# Patient Record
Sex: MALE | Race: WHITE | NOT HISPANIC OR LATINO | Employment: UNEMPLOYED | URBAN - METROPOLITAN AREA
[De-identification: names, ages, dates, MRNs, and addresses within clinical notes are randomized per-mention and may not be internally consistent; named-entity substitution may affect disease eponyms.]

---

## 2017-04-24 ENCOUNTER — HOSPITAL ENCOUNTER (OUTPATIENT)
Facility: HOSPITAL | Age: 72
Discharge: HOME | End: 2017-04-25
Attending: EMERGENCY MEDICINE | Admitting: HOSPITALIST
Payer: MEDICARE

## 2017-04-24 DIAGNOSIS — R07.9 CHEST PAIN, UNSPECIFIED TYPE: Primary | ICD-10-CM

## 2017-04-24 DIAGNOSIS — I10 ESSENTIAL HYPERTENSION: ICD-10-CM

## 2017-04-24 DIAGNOSIS — I10 HTN (HYPERTENSION): ICD-10-CM

## 2017-04-24 DIAGNOSIS — Z82.49 FAMILY HISTORY OF ISCHEMIC HEART DISEASE: ICD-10-CM

## 2017-04-24 DIAGNOSIS — R07.9 CHEST PAIN: ICD-10-CM

## 2017-04-24 DIAGNOSIS — K21.9 GERD (GASTROESOPHAGEAL REFLUX DISEASE): ICD-10-CM

## 2017-04-24 PROCEDURE — 93005 ELECTROCARDIOGRAM TRACING: CPT

## 2017-04-24 PROCEDURE — 93010 ELECTROCARDIOGRAM REPORT: CPT | Mod: ,,, | Performed by: INTERNAL MEDICINE

## 2017-04-24 PROCEDURE — 99285 EMERGENCY DEPT VISIT HI MDM: CPT | Mod: ,,, | Performed by: EMERGENCY MEDICINE

## 2017-04-24 PROCEDURE — 99285 EMERGENCY DEPT VISIT HI MDM: CPT | Mod: 25

## 2017-04-24 NOTE — IP AVS SNAPSHOT
[facility]  [address]  [facility] [address]  Phone: [phone]           Patient Discharge Instructions   Our goal is to set you up for success. This packet includes information on your condition, medications, and your home care.  It will help you care for yourself to prevent having to return to the hospital.     Please ask your nurse if you have any questions.      There are many details to remember when preparing to leave the hospital. Here is what you will need to do:    1. Take your medicine. If you are prescribed medications, review your Medication List on the following pages. You may have new medications to  at the pharmacy and others that you'll need to stop taking. Review the instructions for how and when to take your medications. Talk with your doctor or nurses if you are unsure of what to do.     2. Go to your follow-up appointments. Specific follow-up information is listed in the following pages. Your may be contacted by a nurse or clinical provider about future appointments. Be sure we have all of the phone numbers to reach you. Please contact your provider's office if you are unable to make an appointment.     3. Watch for warning signs. Your doctor or nurse will give you detailed warning signs to watch for and when to call for assistance. These instructions may also include educational information about your condition. If you experience any of warning signs to your health, call your doctor.           [facility]  Unless otherwise directed by your provider, please   contact [facility], our nurse care line   that is available for 24/7 assistance.     [phone] (toll-free)     Registered nurses in the [facility]   provide: appointment scheduling, clinical advisement, health education, and other advisory services.                  ** Verify the list of medication(s) below is accurate and up to date. Carry this with you in case of  emergency. If your medications have changed, please notify your healthcare provider.             Medication List      CHANGE how you take these medications        Additional Info                      atorvastatin 20 MG tablet   Commonly known as:  LIPITOR   Quantity:  30 tablet   Refills:  0   Dose:  20 mg   Indications:  need to clarify strength   What changed:    - medication strength  - how much to take    Instructions:  Take 1 tablet (20 mg total) by mouth once daily.     Begin Date    AM    Noon    PM    Bedtime         CONTINUE taking these medications        Additional Info                      aspirin 162 MG Tbec   Refills:  0   Dose:  81 mg    Instructions:  Take 81 mg by mouth 2 (two) times daily.     Begin Date    AM    Noon    PM    Bedtime       lisinopril 2.5 MG tablet   Commonly known as:  PRINIVIL,ZESTRIL   Refills:  0   Dose:  2.5 mg    Last time this was given:  2.5 mg on 4/25/2017  2:51 PM   Instructions:  Take 2.5 mg by mouth 2 (two) times daily.     Begin Date    AM    Noon    PM    Bedtime       omeprazole 40 MG capsule   Commonly known as:  PRILOSEC   Refills:  0   Dose:  40 mg    Instructions:  Take 40 mg by mouth once daily.     Begin Date    AM    Noon    PM    Bedtime       sildenafil 25 MG tablet   Commonly known as:  VIAGRA   Refills:  0   Dose:  25 mg    Instructions:  Take 25 mg by mouth daily as needed for Erectile Dysfunction.     Begin Date    AM    Noon    PM    Bedtime            Where to Get Your Medications      You can get these medications from any pharmacy     Bring a paper prescription for each of these medications     atorvastatin 20 MG tablet                  Please bring to all follow up appointments:    1. A copy of your discharge instructions.  2. All medicines you are currently taking in their original bottles.  3. Identification and insurance card.    Please arrive 15 minutes ahead of scheduled appointment time.    Please call 24 hours in advance if you must  reschedule your appointment and/or time.        Follow-up Information     Please follow up.    Why:  Follow up with PCP in 1 week         Discharge Instructions     Future Orders    Activity as tolerated     Call MD for:  difficulty breathing or increased cough     Call MD for:  increased confusion or weakness     Call MD for:  persistent dizziness, light-headedness, or visual disturbances     Call MD for:  persistent nausea and vomiting or diarrhea     Call MD for:  severe persistent headache     Call MD for:  severe uncontrolled pain     Call MD for:  temperature >100.4     Diet general     Questions:    Total calories:      Fat restriction, if any:      Protein restriction, if any:      Na restriction, if any:      Fluid restriction:      Additional restrictions:  Cardiac (Low Na/Chol)        Primary Diagnosis     Your primary diagnosis was:  Chest Pain      Admission Information     Date & Time Provider Department [facility]    4/24/2017 11:56 PM Miguel Mccoy MD [facility] [id number]      Care Providers     Provider Role Specialty Primary office phone    Miguel Mccoy MD Attending Provider Hospitalist [phone]    Anupam Lopez MD Team Attending  Hospitalist [phone]    Miguel Mccoy MD Team Attending  Hospitalist [phone]      Your Vitals Were     BP Pulse Temp Resp Height Weight    136/75 (BP Location: Right arm, Patient Position: Lying, BP Method: Automatic) 88 98.2 °F (36.8 °C) (Oral) 19 6' (1.829 m) 94.4 kg (208 lb 1.8 oz)    SpO2 BMI             93% 28.23 kg/m2         Recent Lab Values     No lab values to display.      Allergies as of 4/25/2017     No Known Allergies      Advance Directives     An advance directive is a document which, in the event you are no longer able to make decisions for yourself, tells your healthcare team what kind of treatment you do or do not want to receive, or who you would like to make those decisions for you.  If you do not currently have an  advance directive, [facility] encourages you to create one.  For more information call:  [phone]), [phone] ([phone]),  or log on to [email or web address].        Smoking Cessation     If you would like to quit smoking:   You may be eligible for free services if you are a [place] resident and started smoking cigarettes before September 1, 1988.  Call the Smoking Cessation Trust ([facility]) toll free at [phone] or [phone].   Call 3-949-QUIT-NOW if you do not meet the above criteria.   Contact us via email: [email or web address]   View our website for more information: [email or web address]        Language Assistance Services     ATTENTION: Language assistance services are available, free of charge. Please call [phone].      ATENCIÓN: Si habla español, tiene a haney disposición servicios gratuitos de asistencia lingüística. Llame al [phone].     CHÚ Ý: N?u b?n nói Ti?ng Vi?t, có các d?ch v? h? tr? ngôn ng? mi?n phí dành cho b?n. G?i s? [phone].         [facility] complies with applicable Federal civil rights laws and does not discriminate on the basis of race, color, national origin, age, disability, or sex.

## 2017-04-25 VITALS
RESPIRATION RATE: 19 BRPM | SYSTOLIC BLOOD PRESSURE: 136 MMHG | BODY MASS INDEX: 28.19 KG/M2 | HEART RATE: 88 BPM | HEIGHT: 72 IN | DIASTOLIC BLOOD PRESSURE: 75 MMHG | OXYGEN SATURATION: 93 % | TEMPERATURE: 98 F | WEIGHT: 208.13 LBS

## 2017-04-25 PROBLEM — K21.9 GERD (GASTROESOPHAGEAL REFLUX DISEASE): Status: ACTIVE | Noted: 2017-04-25

## 2017-04-25 PROBLEM — I10 HTN (HYPERTENSION): Status: ACTIVE | Noted: 2017-04-25

## 2017-04-25 PROBLEM — R07.9 CHEST PAIN: Status: ACTIVE | Noted: 2017-04-25

## 2017-04-25 LAB
ALBUMIN SERPL BCP-MCNC: 3.7 G/DL
ALP SERPL-CCNC: 76 U/L
ALT SERPL W/O P-5'-P-CCNC: 37 U/L
ANION GAP SERPL CALC-SCNC: 12 MMOL/L
ANION GAP SERPL CALC-SCNC: 13 MMOL/L
AORTIC VALVE REGURGITATION: NORMAL
AST SERPL-CCNC: 33 U/L
BASOPHILS # BLD AUTO: 0 K/UL
BASOPHILS # BLD AUTO: 0.01 K/UL
BASOPHILS NFR BLD: 0 %
BASOPHILS NFR BLD: 0.1 %
BILIRUB SERPL-MCNC: 0.5 MG/DL
BUN SERPL-MCNC: 14 MG/DL
BUN SERPL-MCNC: 15 MG/DL
CALCIUM SERPL-MCNC: 9 MG/DL
CALCIUM SERPL-MCNC: 9.6 MG/DL
CHLORIDE SERPL-SCNC: 101 MMOL/L
CHLORIDE SERPL-SCNC: 103 MMOL/L
CHOLEST/HDLC SERPL: 3.1 {RATIO}
CO2 SERPL-SCNC: 23 MMOL/L
CO2 SERPL-SCNC: 25 MMOL/L
CREAT SERPL-MCNC: 0.9 MG/DL
CREAT SERPL-MCNC: 0.9 MG/DL
DIASTOLIC DYSFUNCTION: NO
DIFFERENTIAL METHOD: ABNORMAL
DIFFERENTIAL METHOD: ABNORMAL
EOSINOPHIL # BLD AUTO: 0 K/UL
EOSINOPHIL # BLD AUTO: 0.1 K/UL
EOSINOPHIL NFR BLD: 0.3 %
EOSINOPHIL NFR BLD: 1 %
ERYTHROCYTE [DISTWIDTH] IN BLOOD BY AUTOMATED COUNT: 12.1 %
ERYTHROCYTE [DISTWIDTH] IN BLOOD BY AUTOMATED COUNT: 12.1 %
EST. GFR  (AFRICAN AMERICAN): >60 ML/MIN/1.73 M^2
EST. GFR  (AFRICAN AMERICAN): >60 ML/MIN/1.73 M^2
EST. GFR  (NON AFRICAN AMERICAN): >60 ML/MIN/1.73 M^2
EST. GFR  (NON AFRICAN AMERICAN): >60 ML/MIN/1.73 M^2
ESTIMATED PA SYSTOLIC PRESSURE: 33.47
GLUCOSE SERPL-MCNC: 125 MG/DL
GLUCOSE SERPL-MCNC: 140 MG/DL
HCT VFR BLD AUTO: 38.9 %
HCT VFR BLD AUTO: 40.3 %
HDL/CHOLESTEROL RATIO: 32.7 %
HDLC SERPL-MCNC: 153 MG/DL
HDLC SERPL-MCNC: 50 MG/DL
HGB BLD-MCNC: 13.9 G/DL
HGB BLD-MCNC: 14.2 G/DL
LDLC SERPL CALC-MCNC: 80.6 MG/DL
LYMPHOCYTES # BLD AUTO: 0.9 K/UL
LYMPHOCYTES # BLD AUTO: 1 K/UL
LYMPHOCYTES NFR BLD: 12.7 %
LYMPHOCYTES NFR BLD: 14 %
MAGNESIUM SERPL-MCNC: 2 MG/DL
MCH RBC QN AUTO: 37.9 PG
MCH RBC QN AUTO: 38.1 PG
MCHC RBC AUTO-ENTMCNC: 35.2 %
MCHC RBC AUTO-ENTMCNC: 35.7 %
MCV RBC AUTO: 107 FL
MCV RBC AUTO: 108 FL
MITRAL VALVE REGURGITATION: NORMAL
MONOCYTES # BLD AUTO: 0.6 K/UL
MONOCYTES # BLD AUTO: 0.7 K/UL
MONOCYTES NFR BLD: 7.9 %
MONOCYTES NFR BLD: 9.3 %
NEUTROPHILS # BLD AUTO: 5.4 K/UL
NEUTROPHILS # BLD AUTO: 5.4 K/UL
NEUTROPHILS NFR BLD: 76.4 %
NEUTROPHILS NFR BLD: 77.4 %
NONHDLC SERPL-MCNC: 103 MG/DL
PHOSPHATE SERPL-MCNC: 2.5 MG/DL
PLATELET # BLD AUTO: 152 K/UL
PLATELET # BLD AUTO: 166 K/UL
PMV BLD AUTO: 10.4 FL
PMV BLD AUTO: 10.6 FL
POTASSIUM SERPL-SCNC: 3.9 MMOL/L
POTASSIUM SERPL-SCNC: 4.2 MMOL/L
PROT SERPL-MCNC: 7.1 G/DL
RBC # BLD AUTO: 3.65 M/UL
RBC # BLD AUTO: 3.75 M/UL
RETIRED EF AND QEF - SEE NOTES: 60 (ref 55–65)
SODIUM SERPL-SCNC: 138 MMOL/L
SODIUM SERPL-SCNC: 139 MMOL/L
TRICUSPID VALVE REGURGITATION: NORMAL
TRIGL SERPL-MCNC: 112 MG/DL
TROPONIN I SERPL DL<=0.01 NG/ML-MCNC: 0.01 NG/ML
TROPONIN I SERPL DL<=0.01 NG/ML-MCNC: 0.01 NG/ML
TROPONIN I SERPL DL<=0.01 NG/ML-MCNC: 0.02 NG/ML
WBC # BLD AUTO: 7.02 K/UL
WBC # BLD AUTO: 7.07 K/UL

## 2017-04-25 PROCEDURE — 80053 COMPREHEN METABOLIC PANEL: CPT

## 2017-04-25 PROCEDURE — G0378 HOSPITAL OBSERVATION PER HR: HCPCS

## 2017-04-25 PROCEDURE — 93320 DOPPLER ECHO COMPLETE: CPT | Mod: 26,,, | Performed by: INTERNAL MEDICINE

## 2017-04-25 PROCEDURE — 25000003 PHARM REV CODE 250: Performed by: NURSE PRACTITIONER

## 2017-04-25 PROCEDURE — 93325 DOPPLER ECHO COLOR FLOW MAPG: CPT | Mod: 26,,, | Performed by: INTERNAL MEDICINE

## 2017-04-25 PROCEDURE — 80061 LIPID PANEL: CPT

## 2017-04-25 PROCEDURE — 84100 ASSAY OF PHOSPHORUS: CPT

## 2017-04-25 PROCEDURE — 25000003 PHARM REV CODE 250: Performed by: EMERGENCY MEDICINE

## 2017-04-25 PROCEDURE — 93351 STRESS TTE COMPLETE: CPT | Mod: 26,,, | Performed by: INTERNAL MEDICINE

## 2017-04-25 PROCEDURE — 84484 ASSAY OF TROPONIN QUANT: CPT

## 2017-04-25 PROCEDURE — 85025 COMPLETE CBC W/AUTO DIFF WBC: CPT

## 2017-04-25 PROCEDURE — 80048 BASIC METABOLIC PNL TOTAL CA: CPT

## 2017-04-25 PROCEDURE — 99204 OFFICE O/P NEW MOD 45 MIN: CPT | Mod: 25,GC,, | Performed by: INTERNAL MEDICINE

## 2017-04-25 PROCEDURE — 93325 DOPPLER ECHO COLOR FLOW MAPG: CPT

## 2017-04-25 PROCEDURE — 99220 PR INITIAL OBSERVATION CARE,LEVL III: CPT | Mod: ,,, | Performed by: NURSE PRACTITIONER

## 2017-04-25 PROCEDURE — 84484 ASSAY OF TROPONIN QUANT: CPT | Mod: 91

## 2017-04-25 PROCEDURE — 36415 COLL VENOUS BLD VENIPUNCTURE: CPT

## 2017-04-25 PROCEDURE — 83735 ASSAY OF MAGNESIUM: CPT

## 2017-04-25 RX ORDER — ATORVASTATIN CALCIUM 10 MG/1
10 TABLET, FILM COATED ORAL DAILY
Status: ON HOLD | COMMUNITY
End: 2017-04-25

## 2017-04-25 RX ORDER — ACETAMINOPHEN 325 MG/1
650 TABLET ORAL EVERY 4 HOURS PRN
Status: DISCONTINUED | OUTPATIENT
Start: 2017-04-25 | End: 2017-04-25 | Stop reason: HOSPADM

## 2017-04-25 RX ORDER — CYCLOBENZAPRINE HCL 5 MG
5 TABLET ORAL 3 TIMES DAILY PRN
Status: DISCONTINUED | OUTPATIENT
Start: 2017-04-25 | End: 2017-04-25 | Stop reason: HOSPADM

## 2017-04-25 RX ORDER — NAPROXEN SODIUM 220 MG/1
81 TABLET, FILM COATED ORAL DAILY
Status: DISCONTINUED | OUTPATIENT
Start: 2017-04-25 | End: 2017-04-25

## 2017-04-25 RX ORDER — AMOXICILLIN 250 MG
1 CAPSULE ORAL 2 TIMES DAILY PRN
Status: DISCONTINUED | OUTPATIENT
Start: 2017-04-25 | End: 2017-04-25 | Stop reason: HOSPADM

## 2017-04-25 RX ORDER — LISINOPRIL 2.5 MG/1
2.5 TABLET ORAL DAILY
Status: DISCONTINUED | OUTPATIENT
Start: 2017-04-25 | End: 2017-04-25 | Stop reason: HOSPADM

## 2017-04-25 RX ORDER — HYDRALAZINE HYDROCHLORIDE 25 MG/1
25 TABLET, FILM COATED ORAL EVERY 6 HOURS PRN
Status: DISCONTINUED | OUTPATIENT
Start: 2017-04-25 | End: 2017-04-25 | Stop reason: HOSPADM

## 2017-04-25 RX ORDER — ATORVASTATIN CALCIUM 20 MG/1
20 TABLET, FILM COATED ORAL DAILY
Qty: 30 TABLET | Refills: 0 | Status: SHIPPED | OUTPATIENT
Start: 2017-04-25

## 2017-04-25 RX ORDER — IBUPROFEN 200 MG
16 TABLET ORAL
Status: DISCONTINUED | OUTPATIENT
Start: 2017-04-25 | End: 2017-04-25 | Stop reason: HOSPADM

## 2017-04-25 RX ORDER — IBUPROFEN 200 MG
24 TABLET ORAL
Status: DISCONTINUED | OUTPATIENT
Start: 2017-04-25 | End: 2017-04-25 | Stop reason: HOSPADM

## 2017-04-25 RX ORDER — PANTOPRAZOLE SODIUM 40 MG/1
40 TABLET, DELAYED RELEASE ORAL DAILY
Status: DISCONTINUED | OUTPATIENT
Start: 2017-04-25 | End: 2017-04-25 | Stop reason: HOSPADM

## 2017-04-25 RX ORDER — RAMELTEON 8 MG/1
8 TABLET ORAL NIGHTLY PRN
Status: DISCONTINUED | OUTPATIENT
Start: 2017-04-25 | End: 2017-04-25 | Stop reason: HOSPADM

## 2017-04-25 RX ORDER — ENOXAPARIN SODIUM 100 MG/ML
40 INJECTION SUBCUTANEOUS EVERY 24 HOURS
Status: DISCONTINUED | OUTPATIENT
Start: 2017-04-25 | End: 2017-04-25 | Stop reason: HOSPADM

## 2017-04-25 RX ORDER — NAPROXEN SODIUM 220 MG/1
81 TABLET, FILM COATED ORAL DAILY
Status: DISCONTINUED | OUTPATIENT
Start: 2017-04-25 | End: 2017-04-25 | Stop reason: HOSPADM

## 2017-04-25 RX ORDER — ASPIRIN 325 MG
325 TABLET ORAL ONCE
Status: DISCONTINUED | OUTPATIENT
Start: 2017-04-25 | End: 2017-04-25

## 2017-04-25 RX ORDER — GLUCAGON 1 MG
1 KIT INJECTION
Status: DISCONTINUED | OUTPATIENT
Start: 2017-04-25 | End: 2017-04-25 | Stop reason: HOSPADM

## 2017-04-25 RX ORDER — ONDANSETRON 8 MG/1
8 TABLET, ORALLY DISINTEGRATING ORAL EVERY 8 HOURS PRN
Status: DISCONTINUED | OUTPATIENT
Start: 2017-04-25 | End: 2017-04-25 | Stop reason: HOSPADM

## 2017-04-25 RX ADMIN — ALUMINUM HYDROXIDE, MAGNESIUM HYDROXIDE, AND SIMETHICONE 50 ML: 200; 200; 20 SUSPENSION ORAL at 04:04

## 2017-04-25 RX ADMIN — ASPIRIN 81 MG CHEWABLE TABLET 81 MG: 81 TABLET CHEWABLE at 02:04

## 2017-04-25 RX ADMIN — LISINOPRIL 2.5 MG: 2.5 TABLET ORAL at 02:04

## 2017-04-25 RX ADMIN — CYCLOBENZAPRINE HYDROCHLORIDE 5 MG: 5 TABLET, FILM COATED ORAL at 04:04

## 2017-04-25 RX ADMIN — PANTOPRAZOLE SODIUM 40 MG: 40 TABLET, DELAYED RELEASE ORAL at 02:04

## 2017-04-25 NOTE — ED TRIAGE NOTES
Was watching tv tonight and started having ear pain, neck pain. Chest and back pain started while he was driving here. Reports he checked his bp at home 179/106 and checked it a second time and it was 160/90. Pt denies similar symptoms in the past. C/o bilateral chest wall pain, upper back pain, and jaw pain rates 6/10.

## 2017-04-25 NOTE — ED PROVIDER NOTES
Encounter Date: 4/24/2017       History     Chief Complaint   Patient presents with    Hypertension     Started with bilateral ear pain and neck pain. Took BP and it was elevated.     Review of patient's allergies indicates:  No Known Allergies  HPI Comments: 72 yo M to the ED with CC of acute onset of dull achy pain in the jaw, that traveled down into his chest within minutes and is described as someone punching him.  Onset of symptoms 2 hours PTA. He was resting watching television when the pain began. He has never had this pain before. He has HTN, manages with medication and Hx of prostate cancer, treated and followed. No recent trauma or injury. No nausea or emesis.     Past Medical History:   Diagnosis Date    Fatty liver     GERD (gastroesophageal reflux disease)     Hiatal hernia     Hypertension      Past Surgical History:   Procedure Laterality Date    APPENDECTOMY      CARDIAC CATHETERIZATION      ESOPHAGOGASTRODUODENOSCOPY      LIVER BIOPSY      PROSTATECTOMY      REMOVAL OF CYST FROM RIGHT KNEE       No family history on file.  Social History   Substance Use Topics    Smoking status: Former Smoker     Quit date: 10/12/1981    Smokeless tobacco: Never Used    Alcohol use Yes      Comment: DAILY      Review of Systems   Constitutional: Negative for fever.   HENT: Negative for sore throat.    Respiratory: Negative for shortness of breath.    Cardiovascular: Positive for chest pain. Negative for palpitations and leg swelling.   Gastrointestinal: Negative for nausea.   Genitourinary: Negative for dysuria.   Musculoskeletal: Negative for back pain.   Skin: Negative for rash.   Neurological: Negative for weakness.   Hematological: Does not bruise/bleed easily.       Physical Exam   Initial Vitals   BP Pulse Resp Temp SpO2   04/24/17 2324 04/24/17 2324 04/24/17 2324 04/24/17 2324 04/24/17 2324   194/93 84 16 98 °F (36.7 °C) 97 %     Physical Exam    Constitutional: Vital signs are normal. He  appears well-developed and well-nourished.   HENT:   Head: Normocephalic and atraumatic.   Normal exam   Eyes: Conjunctivae are normal.   Cardiovascular: Normal rate and regular rhythm. Exam reveals no gallop and no friction rub.    No murmur heard.  Pulmonary/Chest: No respiratory distress. He has no wheezes. He has no rhonchi. He has no rales. He exhibits no tenderness.   Abdominal: Soft. Normal appearance, normal aorta and bowel sounds are normal.   Musculoskeletal: Normal range of motion.   Neurological: He is alert and oriented to person, place, and time.   Skin: Skin is warm and intact.   Psychiatric: He has a normal mood and affect. His speech is normal and behavior is normal. Cognition and memory are normal.         ED Course   Procedures  Labs Reviewed   CBC W/ AUTO DIFFERENTIAL - Abnormal; Notable for the following:        Result Value    RBC 3.65 (*)     Hemoglobin 13.9 (*)     Hematocrit 38.9 (*)      (*)     MCH 38.1 (*)     Gran% 76.4 (*)     Lymph% 14.0 (*)     All other components within normal limits   COMPREHENSIVE METABOLIC PANEL - Abnormal; Notable for the following:     Glucose 140 (*)     All other components within normal limits   TROPONIN I             Medical Decision Making:   ED Management:  ACS evaluation in the ER has been negative to this point. Pt blood pressure has improved without treatment in the ED. He appears comfortable and is in NAD.   Plan to Obs.                    ED Course     Clinical Impression:   The primary encounter diagnosis was Chest pain, unspecified type. Diagnoses of Chest pain and Essential hypertension were also pertinent to this visit.    Disposition:   Disposition: Admitted  Condition: Stable       Joel Suarez PA-C  04/25/17 0357

## 2017-04-25 NOTE — ASSESSMENT & PLAN NOTE
- BP elevated on arrival 194/93 now 134/89  - continue home lisinopril dosing  - will add PRN hydralazine for SBP greater than 180

## 2017-04-25 NOTE — H&P
[facility] Hospital Medicine  History & Physical    Patient Name: Jb Dorsey  MRN: [id number]  Admission Date: 4/24/2017  Attending Physician: Hardik Aburto DO   Primary Care Provider: Primary Doctor [facility] Medicine Team: [facility] [place] [place] [facility]OSP MED E Kt Quinteros NP     Patient information was obtained from patient and ER records.     Subjective:     Principal Problem:Chest pain    Chief Complaint:   Chief Complaint   Patient presents with    Hypertension     Started with bilateral ear pain and neck pain. Took BP and it was elevated.        HPI:      Past Medical History:   Diagnosis Date    Fatty liver     GERD (gastroesophageal reflux disease)     Hiatal hernia     Hypertension        Past Surgical History:   Procedure Laterality Date    APPENDECTOMY      CARDIAC CATHETERIZATION      ESOPHAGOGASTRODUODENOSCOPY      LIVER BIOPSY      PROSTATECTOMY      REMOVAL OF CYST FROM RIGHT KNEE         Review of patient's allergies indicates:  No Known Allergies    No current facility-administered medications on file prior to encounter.      Current Outpatient Prescriptions on File Prior to Encounter   Medication Sig    aspirin 162 MG TbEC Take 162 mg by mouth every 6 (six) hours as needed.    lisinopril (PRINIVIL,ZESTRIL) 2.5 MG tablet Take 2.5 mg by mouth once daily.    omeprazole (PRILOSEC) 40 MG capsule Take 40 mg by mouth once daily.    sildenafil (VIAGRA) 25 MG tablet Take 25 mg by mouth daily as needed for Erectile Dysfunction.     Family History     None        Social History Main Topics    Smoking status: Former Smoker     Quit date: 10/12/1981    Smokeless tobacco: Never Used    Alcohol use Yes      Comment: DAILY     Drug use: No    Sexual activity: Yes     Partners: Female     Review of Systems   Constitutional: Negative for chills and fever.   HENT: Positive for ear pain. Negative for congestion.    Eyes: Negative for visual disturbance.   Respiratory: Negative for cough, chest  tightness and shortness of breath.    Cardiovascular: Positive for chest pain. Negative for palpitations and leg swelling.   Gastrointestinal: Negative for abdominal distention, abdominal pain, nausea and vomiting.   Endocrine: Negative for cold intolerance and heat intolerance.   Genitourinary: Negative for dysuria and hematuria.   Musculoskeletal: Positive for back pain and neck pain. Negative for joint swelling.   Skin: Negative for rash.   Neurological: Negative for dizziness, seizures, syncope and light-headedness.   Hematological: Does not bruise/bleed easily.     Objective:     Vital Signs (Most Recent):  Temp: 98 °F (36.7 °C) (04/24/17 2324)  Pulse: 84 (04/25/17 0244)  Resp: 16 (04/25/17 0244)  BP: (!) 163/83 (04/25/17 0244)  SpO2: 97 % (04/25/17 0251) Vital Signs (24h Range):  Temp:  [98 °F (36.7 °C)] 98 °F (36.7 °C)  Pulse:  [82-84] 84  Resp:  [16-18] 16  SpO2:  [95 %-97 %] 97 %  BP: (153-194)/(74-93) 163/83     Weight: 97.1 kg (214 lb)  Body mass index is 29.02 kg/(m^2).    Physical Exam   Constitutional: He is oriented to person, place, and time. He appears well-developed and well-nourished. No distress.   HENT:   Head: Normocephalic and atraumatic.   Eyes: EOM are normal. Pupils are equal, round, and reactive to light.   Neck: Normal range of motion. Neck supple.   Cardiovascular: Normal rate, regular rhythm, normal heart sounds and intact distal pulses.  Exam reveals no gallop and no friction rub.    No murmur heard.  Pulmonary/Chest: Effort normal and breath sounds normal. No respiratory distress. He has no wheezes. He has no rales.   Abdominal: Soft. Bowel sounds are normal. He exhibits no distension. There is no tenderness.   Musculoskeletal: Normal range of motion. He exhibits no edema.   Neurological: He is alert and oriented to person, place, and time.   Skin: Skin is warm and dry. He is not diaphoretic.   Psychiatric: He has a normal mood and affect. His behavior is normal.   Nursing note and  vitals reviewed.     Significant Labs:   BMP:   Recent Labs  Lab 04/25/17 0054   *      K 3.9      CO2 23   BUN 15   CREATININE 0.9   CALCIUM 9.0     CBC:   Recent Labs  Lab 04/25/17 0054   WBC 7.07   HGB 13.9*   HCT 38.9*        Troponin:   Recent Labs  Lab 04/25/17 0054   TROPONINI 0.015     All pertinent labs within the past 24 hours have been reviewed.    Significant Imaging: I have reviewed all pertinent imaging results/findings within the past 24 hours.    Assessment/Plan:     * Chest pain  - originated in the jaw with radiation to the neck followed by back and chest pain.  - jaw pain resolved, neck pain persists with musculoskeletal complaints, acetaminophen and cyclobenzaprine PRN   - no widening of the mediastinum on CXR  - EKG without ST elevation or depression  - intitial troponin 0.015 continue to trend   - cardiology consult   - continue ASA    HTN (hypertension)  - BP elevated on arrival 194/93 now 134/89  - continue home lisinopril dosing  - will add PRN hydralazine for SBP greater than 180    GERD (gastroesophageal reflux disease)  - hx of GERD and hiatal hernia   - pantoprazole daily  - GI cocktail x1    VTE Risk Mitigation         Ordered     enoxaparin injection 40 mg  Daily     Route:  Subcutaneous        04/25/17 0341     Medium Risk of VTE  Once      04/25/17 0341     Place JESSICA hose  Until discontinued      04/25/17 0247     Place sequential compression device  Until discontinued      04/25/17 0247        Kt Quinteros NP  Department of Hospital Medicine   [facility]

## 2017-04-25 NOTE — CONSULTS
[facility] [place]   Cardiology Consult Note    Attending Physician: Miguel Mccoy MD  Reason for Consult: Chest pain    HPI:     This is Mr. Jb Dorsey, 71 year old male known to have hypertension, GERD disease, presented to ED with chest pain. His chest pain started yesterday when was watching TV and drinking his wine. Started initially at his bilateral jaws and neck, then travel to his chest, then radiate to his back. He describe the pain as pulling muscle, change with positioning, no known alleviating factors. He [facility] year and half ago, and according to him, it sowed no stenosis and no stent placed. He has been on aspirin since then.     Review of Systems   Constitution: Negative for chills and decreased appetite.   HENT: Negative for congestion and headaches.    Cardiovascular: Positive for chest pain (started at his jaw and radiated to his back.). Negative for irregular heartbeat and leg swelling.   Respiratory: Negative for cough and shortness of breath.    Endocrine: Negative for cold intolerance and heat intolerance.   Skin: Negative for rash.   Musculoskeletal: Negative for arthritis and back pain.   Gastrointestinal: Negative for abdominal pain, constipation and diarrhea.   Genitourinary: Negative for dysuria and hematuria.   Neurological: Negative for dizziness.   Psychiatric/Behavioral: Negative for altered mental status.     PMH:     Past Medical History:   Diagnosis Date    Fatty liver     GERD (gastroesophageal reflux disease)     Hiatal hernia     Hypertension      Past Surgical History:   Procedure Laterality Date    APPENDECTOMY      CARDIAC CATHETERIZATION      ESOPHAGOGASTRODUODENOSCOPY      LIVER BIOPSY      PROSTATECTOMY      REMOVAL OF CYST FROM RIGHT KNEE          Allergies:     Review of patient's allergies indicates:  No Known Allergies     Medications:     No current facility-administered  medications on file prior to encounter.      Current Outpatient Prescriptions on File Prior to Encounter   Medication Sig Dispense Refill    aspirin 162 MG TbEC Take 81 mg by mouth 2 (two) times daily.       lisinopril (PRINIVIL,ZESTRIL) 2.5 MG tablet Take 2.5 mg by mouth 2 (two) times daily.       omeprazole (PRILOSEC) 40 MG capsule Take 40 mg by mouth once daily.      sildenafil (VIAGRA) 25 MG tablet Take 25 mg by mouth daily as needed for Erectile Dysfunction.          Social History:     Social History   Substance Use Topics    Smoking status: Former Smoker     Quit date: 10/12/1981    Smokeless tobacco: Never Used    Alcohol use Yes      Comment: DAILY         Family History:     History reviewed. No pertinent family history.     Physical Exam:     Vitals:  Temp:  [98 °F (36.7 °C)-98.8 °F (37.1 °C)]   Pulse:  [79-94]   Resp:  [16-18]   BP: (134-194)/(69-93)   SpO2:  [95 %-97 %]   I/O's:  No intake or output data in the 24 hours ending 04/25/17 1006     Physical Exam   Constitutional: He is oriented to person, place, and time. He appears well-nourished. No distress.   HENT:   Head: Normocephalic.   Eyes: Pupils are equal, round, and reactive to light.   Neck: No JVD present. No thyromegaly present.   Cardiovascular: Normal rate and regular rhythm.  Exam reveals no friction rub.    No murmur heard.  Pulmonary/Chest: Effort normal. No respiratory distress. He has no wheezes. He has no rales.   Abdominal: Soft. He exhibits distension. There is tenderness.   Musculoskeletal: He exhibits no edema.   Neurological: He is alert and oriented to person, place, and time.   Vitals reviewed.      Labs:     Recent Results (from the past 336 hour(s))   CBC with Automated Differential    Collection Time: 04/25/17  3:59 AM   Result Value Ref Range    WBC 7.02 3.90 - 12.70 K/uL    Hemoglobin 14.2 14.0 - 18.0 g/dL    Hematocrit 40.3 40.0 - 54.0 %    Platelets 166 150 - 350 K/uL   CBC auto differential    Collection Time:  04/25/17 12:54 AM   Result Value Ref Range    WBC 7.07 3.90 - 12.70 K/uL    Hemoglobin 13.9 (L) 14.0 - 18.0 g/dL    Hematocrit 38.9 (L) 40.0 - 54.0 %    Platelets 152 150 - 350 K/uL       Recent Results (from the past 336 hour(s))   Basic Metabolic Panel (BMP)    Collection Time: 04/25/17  3:59 AM   Result Value Ref Range    Sodium 138 136 - 145 mmol/L    Potassium 4.2 3.5 - 5.1 mmol/L    Chloride 101 95 - 110 mmol/L    CO2 25 23 - 29 mmol/L    BUN, Bld 14 8 - 23 mg/dL    Creatinine 0.9 0.5 - 1.4 mg/dL    Calcium 9.6 8.7 - 10.5 mg/dL    Anion Gap 12 8 - 16 mmol/L       Recent Labs  Lab 04/25/17  0054   TROPONINI 0.015     Echo:   No Echo on system.    CXR:   The cardiomediastinal silhouette is not enlarged, noting calcified tortuous aorta.  There is no pleural effusion.     EKG:   Normal Sinus Rhythm with signs of ischemia.     Assessment & Recommendations:     This is Mr. Jb Dorsey, 71 year old male known to have hypertension, GERD disease, presented to ED with chest pain. Cardiology consulted for his chest pain.     Chest pain most likely not to coronary artery disease   - His chest pain is most likely non cardiac based on above characteristics. ECG showed no ischemia, and his Troponin initially was negative.  - [facility] that was done around a year and half ago.   - Continue his home aspirin   - Agree with Stress Echo to investigate any inducible ischemia.   - Requested for Lipid Panel (since he is NPO for stress test), to investigate his ASCVD for the next 10 years to start him on Statin.   - Consider D Dimer as part of work up of his Aortic Dissection (Chest pain and radiating to back with Calcified Aorta on CXR).  - Walking ECG as stress test for inducible ischemia (ordered).     Thank you for this consult. Further recommendations are pending the attending addendum. Please do not hesitate to page with questions.     Ismael Lawson MD  Internal  Medicine Resident ([id number]-I)  Pager: [phone]

## 2017-04-25 NOTE — NURSING
D/C instructions provided to pt and spouse, all medications and times for next administration reviewed. IV and tele box removed. Pt declines wheelchair.

## 2017-04-25 NOTE — SUBJECTIVE & OBJECTIVE
Past Medical History:   Diagnosis Date    Fatty liver     GERD (gastroesophageal reflux disease)     Hiatal hernia     Hypertension        Past Surgical History:   Procedure Laterality Date    APPENDECTOMY      CARDIAC CATHETERIZATION      ESOPHAGOGASTRODUODENOSCOPY      LIVER BIOPSY      PROSTATECTOMY      REMOVAL OF CYST FROM RIGHT KNEE         Review of patient's allergies indicates:  No Known Allergies    No current facility-administered medications on file prior to encounter.      Current Outpatient Prescriptions on File Prior to Encounter   Medication Sig    aspirin 162 MG TbEC Take 162 mg by mouth every 6 (six) hours as needed.    lisinopril (PRINIVIL,ZESTRIL) 2.5 MG tablet Take 2.5 mg by mouth once daily.    omeprazole (PRILOSEC) 40 MG capsule Take 40 mg by mouth once daily.    sildenafil (VIAGRA) 25 MG tablet Take 25 mg by mouth daily as needed for Erectile Dysfunction.     Family History     None        Social History Main Topics    Smoking status: Former Smoker     Quit date: 10/12/1981    Smokeless tobacco: Never Used    Alcohol use Yes      Comment: DAILY     Drug use: No    Sexual activity: Yes     Partners: Female     Review of Systems   Constitutional: Negative for chills and fever.   HENT: Positive for ear pain. Negative for congestion.    Eyes: Negative for visual disturbance.   Respiratory: Negative for cough, chest tightness and shortness of breath.    Cardiovascular: Positive for chest pain. Negative for palpitations and leg swelling.   Gastrointestinal: Negative for abdominal distention, abdominal pain, nausea and vomiting.   Endocrine: Negative for cold intolerance and heat intolerance.   Genitourinary: Negative for dysuria and hematuria.   Musculoskeletal: Positive for back pain and neck pain. Negative for joint swelling.   Skin: Negative for rash.   Neurological: Negative for dizziness, seizures, syncope and light-headedness.   Hematological: Does not bruise/bleed easily.      Objective:     Vital Signs (Most Recent):  Temp: 98 °F (36.7 °C) (04/24/17 2324)  Pulse: 84 (04/25/17 0244)  Resp: 16 (04/25/17 0244)  BP: (!) 163/83 (04/25/17 0244)  SpO2: 97 % (04/25/17 0251) Vital Signs (24h Range):  Temp:  [98 °F (36.7 °C)] 98 °F (36.7 °C)  Pulse:  [82-84] 84  Resp:  [16-18] 16  SpO2:  [95 %-97 %] 97 %  BP: (153-194)/(74-93) 163/83     Weight: 97.1 kg (214 lb)  Body mass index is 29.02 kg/(m^2).    Physical Exam   Constitutional: He is oriented to person, place, and time. He appears well-developed and well-nourished. No distress.   HENT:   Head: Normocephalic and atraumatic.   Eyes: EOM are normal. Pupils are equal, round, and reactive to light.   Neck: Normal range of motion. Neck supple.   Cardiovascular: Normal rate, regular rhythm, normal heart sounds and intact distal pulses.  Exam reveals no gallop and no friction rub.    No murmur heard.  Pulmonary/Chest: Effort normal and breath sounds normal. No respiratory distress. He has no wheezes. He has no rales.   Abdominal: Soft. Bowel sounds are normal. He exhibits no distension. There is no tenderness.   Musculoskeletal: Normal range of motion. He exhibits no edema.   Neurological: He is alert and oriented to person, place, and time.   Skin: Skin is warm and dry. He is not diaphoretic.   Psychiatric: He has a normal mood and affect. His behavior is normal.   Nursing note and vitals reviewed.     Significant Labs:   BMP:   Recent Labs  Lab 04/25/17 0054   *      K 3.9      CO2 23   BUN 15   CREATININE 0.9   CALCIUM 9.0     CBC:   Recent Labs  Lab 04/25/17 0054   WBC 7.07   HGB 13.9*   HCT 38.9*        Troponin:   Recent Labs  Lab 04/25/17 0054   TROPONINI 0.015     All pertinent labs within the past 24 hours have been reviewed.    Significant Imaging: I have reviewed all pertinent imaging results/findings within the past 24 hours.

## 2017-04-25 NOTE — PLAN OF CARE
04/25/17 1353   Discharge Assessment   Assessment Type Discharge Planning Assessment     Patient currently not available at this time and CM to meet with patient at a later time for completion of discharge planning assessment.

## 2017-04-25 NOTE — ASSESSMENT & PLAN NOTE
- originated in the jaw with radiation to the neck followed by back and chest pain.  - jaw pain resolved, neck pain persists with musculoskeletal complaints, acetaminophen and cyclobenzaprine PRN   - no widening of the mediastinum on CXR  - EKG without ST elevation or depression  - intitial troponin 0.015 continue to trend   - cardiology consult   - continue ASA

## 2017-04-25 NOTE — NURSING
Patient arrived to unit per wheelchair PER transport in no distress vital signs stable still chest soreness prn given patient tolerated well AAOX4 ambulatory independently. On RA. Will continue to monitor.

## 2017-05-01 NOTE — ASSESSMENT & PLAN NOTE
- originated in the jaw with radiation to the neck followed by back and chest pain. Symptoms currently resolved.   - no widening of the mediastinum on CXR  - EKG without ST elevation or depression  - Troponin negative x 3 (0.015>0.016>0.011)   - cardiology consulted and recs appreciated  - continue ASA  - Exercise Stress Echo 4/25/17 with no e/o ischemia  - Patient follows with Cardiology as outpatient.

## 2017-05-01 NOTE — ASSESSMENT & PLAN NOTE
- BP elevated on arrival 194/93 now 134/89 and better controlled  - continue home lisinopril dosing  - PRN hydralazine for SBP greater than 180

## 2017-05-01 NOTE — DISCHARGE SUMMARY
[facility] Hospital Medicine  Discharge Summary      Patient Name: Jb Dorsey  MRN: [id number]  Admission Date: 4/24/2017  Hospital Length of Stay: 0 days  Discharge Date: 4/25/2017  Attending Physician: Dr. Mccoy  Discharging Provider: Mariluz Harkins PA-C  Primary Care Provider: Primary Doctor [facility] Medicine Team: [facility] MED E Mariluz Harkins PA-C    HPI:   Pleasant 70 y/o gentleman with PMH of HTN, GERD, prostate cancer, and hiatal hernia, presents to the ED with complaints of chest a back pain.  He states that after dinner pain started in his jaw and radiated to his neck.  He states that the jaw pain was similar in feeling to bilateral ear aches which has improved since arrival.  His neck pain persists and he states that his neck discomfort feels musculoskeletal - as it hurts when he moves his head. He states that the pain in his neck may be related to the chair he is currently residing in.  His back and chest pain persist.  EKG was performed and he was noted to be NSR without signs of STEMI.  Initial troponin was 0.015.  He states that he had spaghetti and meatballs with red wine for dinner shortly before the pain started.  He states that [facility] and [facility] said [facility] was normal. He takes Prilosec daily.   He denies SOB, N/V/D, recent illness, fever, or chills.     * No surgery found *      Indwelling Lines/Drains at time of discharge:   Lines/Drains/Airways          No matching active lines, drains, or airways        Hospital Course:   Placed in observation for evaluation of chest pain. Troponin wnl x 3. Exercise stress echo completed 4/25/17 negative for ischemia. Chest pain resolved. Cardiology consulted and recommend to continue home dose of ASA. [facility] Discharged home in stable condition with outpatient follow up with PCP.      Consults:   Consults         Status Ordering Provider     Inpatient consult to Cardiology   Once     Provider:  (Not yet assigned)    Completed SHIRLEY SHEN        Significant Diagnostic Studies: Labs: All labs within the past 24 hours have been reviewed  Cardiac Graphics: Exercise Stress Test 4/25/2017  CONCLUSIONS     1 - Normal left ventricular systolic function (EF 60-65%).     2 - Normal left ventricular diastolic function.     3 - Normal right ventricular systolic function .     4 - The estimated PA systolic pressure is 33 mmHg.     5 - Mild aortic regurgitation.     6 - Mild mitral regurgitation.     7 -  ascending aorta upper limit of normal.     8 - Trivial tricuspid regurgitation.     No evidence of stress induced myocardial ischemia.    Pending Diagnostic Studies:     None        Final Active Diagnoses:    Diagnosis Date Noted POA    PRINCIPAL PROBLEM:  Chest pain [R07.9] 04/25/2017 Yes    HTN (hypertension) [I10] 04/25/2017 Yes    GERD (gastroesophageal reflux disease) [K21.9] 04/25/2017 Yes    Family history of ischemic heart disease [Z82.49]  Not Applicable      Problems Resolved During this Admission:    Diagnosis Date Noted Date Resolved POA      * Chest pain  - originated in the jaw with radiation to the neck followed by back and chest pain. Symptoms currently resolved.   - no widening of the mediastinum on CXR  - EKG without ST elevation or depression  - Troponin negative x 3 (0.015>0.016>0.011)   - cardiology consulted and recs appreciated  - continue ASA and started on statin  - Exercise Stress Echo 4/25/17 with no e/o ischemia  - Patient follows with Cardiology as outpatient.     HTN (hypertension)  - BP elevated on arrival 194/93 now 134/89 and better controlled  - continue home lisinopril dosing  - PRN hydralazine for SBP greater than 180    GERD (gastroesophageal reflux disease)  - hx of GERD and hiatal hernia   - pantoprazole daily  - GI cocktail x1    Family history of ischemic heart disease  - see above    Discharged Condition: good    Disposition: Home or Self Care    Follow  Up:  Follow-up Information     Please follow up.    Why:  Follow up with PCP in 1 week         Patient Instructions:     Diet general   Order Specific Question Answer Comments   Additional restrictions: Cardiac (Low Na/Chol)      Activity as tolerated     Call MD for:  temperature >100.4     Call MD for:  persistent nausea and vomiting or diarrhea     Call MD for:  severe uncontrolled pain     Call MD for:  difficulty breathing or increased cough     Call MD for:  severe persistent headache     Call MD for:  increased confusion or weakness     Call MD for:  persistent dizziness, light-headedness, or visual disturbances       Medications:  Reconciled Home Medications:   Discharge Medication List as of 4/25/2017  3:55 PM      CONTINUE these medications which have CHANGED    Details   atorvastatin (LIPITOR) 20 MG tablet Take 1 tablet (20 mg total) by mouth once daily., Starting 4/25/2017, Until Discontinued, Print         CONTINUE these medications which have NOT CHANGED    Details   aspirin 162 MG TbEC Take 81 mg by mouth 2 (two) times daily. , Until Discontinued, Historical Med      lisinopril (PRINIVIL,ZESTRIL) 2.5 MG tablet Take 2.5 mg by mouth 2 (two) times daily. , Until Discontinued, Historical Med      omeprazole (PRILOSEC) 40 MG capsule Take 40 mg by mouth once daily., Until Discontinued, Historical Med      sildenafil (VIAGRA) 25 MG tablet Take 25 mg by mouth daily as needed for Erectile Dysfunction., Until Discontinued, Historical Med           Time spent on the discharge of patient: 36 minutes    Mariluz Harkins PA-C  Department of Hospital Medicine  [facility]

## 2022-11-08 NOTE — PROVIDER PROGRESS NOTES - EMERGENCY DEPT.
Appt given Encounter Date: 4/24/2017    ED Physician Progress Notes       SCRIBE NOTE: I, Rosaura Colvin, am scribing for, and in the presence of,  Dr. Jiménez.  Physician Statement: I, Dr. Jiménez, personally performed the services described in this documentation as scribed by Rosaura Colvin in my presence, and it is both accurate and complete.      EKG - STEMI Decision  Initial Reading: No STEMI present.

## 2023-10-08 ENCOUNTER — OFFICE VISIT (OUTPATIENT)
Dept: URGENT CARE | Facility: CLINIC | Age: 78
End: 2023-10-08
Payer: MEDICARE

## 2023-10-08 VITALS
TEMPERATURE: 98 F | DIASTOLIC BLOOD PRESSURE: 83 MMHG | SYSTOLIC BLOOD PRESSURE: 146 MMHG | HEIGHT: 72 IN | OXYGEN SATURATION: 96 % | WEIGHT: 208 LBS | BODY MASS INDEX: 28.17 KG/M2 | RESPIRATION RATE: 17 BRPM | HEART RATE: 67 BPM

## 2023-10-08 DIAGNOSIS — B34.9 VIRAL SYNDROME: Primary | ICD-10-CM

## 2023-10-08 LAB
CTP QC/QA: YES
MOLECULAR STREP A: NEGATIVE
POC MOLECULAR INFLUENZA A AGN: NEGATIVE
POC MOLECULAR INFLUENZA B AGN: NEGATIVE
SARS-COV-2 AG RESP QL IA.RAPID: NEGATIVE

## 2023-10-08 PROCEDURE — 87651 STREP A DNA AMP PROBE: CPT | Mod: QW,S$GLB,, | Performed by: NURSE PRACTITIONER

## 2023-10-08 PROCEDURE — 87811 SARS-COV-2 COVID19 W/OPTIC: CPT | Mod: QW,S$GLB,, | Performed by: NURSE PRACTITIONER

## 2023-10-08 PROCEDURE — 87651 POCT STREP A MOLECULAR: ICD-10-PCS | Mod: QW,S$GLB,, | Performed by: NURSE PRACTITIONER

## 2023-10-08 PROCEDURE — 87502 POCT INFLUENZA A/B MOLECULAR: ICD-10-PCS | Mod: QW,S$GLB,, | Performed by: NURSE PRACTITIONER

## 2023-10-08 PROCEDURE — 87502 INFLUENZA DNA AMP PROBE: CPT | Mod: QW,S$GLB,, | Performed by: NURSE PRACTITIONER

## 2023-10-08 PROCEDURE — 99203 OFFICE O/P NEW LOW 30 MIN: CPT | Mod: S$GLB,,, | Performed by: NURSE PRACTITIONER

## 2023-10-08 PROCEDURE — 99203 PR OFFICE/OUTPT VISIT, NEW, LEVL III, 30-44 MIN: ICD-10-PCS | Mod: S$GLB,,, | Performed by: NURSE PRACTITIONER

## 2023-10-08 PROCEDURE — 87811 SARS CORONAVIRUS 2 ANTIGEN POCT, MANUAL READ: ICD-10-PCS | Mod: QW,S$GLB,, | Performed by: NURSE PRACTITIONER

## 2023-10-08 RX ORDER — METOPROLOL SUCCINATE 25 MG/1
TABLET, EXTENDED RELEASE ORAL
COMMUNITY
Start: 2023-07-03 | End: 2024-07-03

## 2023-10-08 RX ORDER — LORATADINE 10 MG/1
10 TABLET ORAL
COMMUNITY

## 2023-10-08 RX ORDER — OMEPRAZOLE 20 MG/1
20 TABLET, ORALLY DISINTEGRATING, DELAYED RELEASE ORAL
COMMUNITY
Start: 2023-07-03

## 2023-10-08 RX ORDER — MAGNESIUM OXIDE 420 MG/1
TABLET ORAL
COMMUNITY
Start: 2023-10-04 | End: 2024-07-03

## 2023-10-08 NOTE — PROGRESS NOTES
Subjective:      Patient ID: Jb Dorsey is a 77 y.o. male.    Vitals:  height is 6' (1.829 m) and weight is 94.3 kg (208 lb). His temperature is 97.7 °F (36.5 °C). His blood pressure is 146/83 (abnormal) and his pulse is 67. His respiration is 17 and oxygen saturation is 96%.     Chief Complaint: Cough    This is a 77 y.o. male who presents today with a chief complaint of cough with onset of 2 days. Pt has been coughing up phlegm. He is feeling better today. Taking Zicam which provided relief.     Cough  This is a new problem. The current episode started in the past 7 days. The problem has been gradually improving. The cough is Productive of sputum. Associated symptoms include nasal congestion and postnasal drip. Pertinent negatives include no fever, headaches, sore throat, shortness of breath or wheezing. Nothing aggravates the symptoms. Risk factors for lung disease include travel. Treatments tried: Zicam. The treatment provided significant relief. There is no history of asthma, bronchitis, environmental allergies or pneumonia.     Constitution: Negative for fever.   HENT:  Positive for congestion and postnasal drip. Negative for sore throat.    Respiratory:  Positive for cough. Negative for shortness of breath and wheezing.    Gastrointestinal:  Negative for vomiting and diarrhea.   Allergic/Immunologic: Negative for environmental allergies.   Neurological:  Negative for headaches.      Objective:     Physical Exam   Constitutional: He is oriented to person, place, and time. He appears well-developed. He is cooperative.  Non-toxic appearance. He does not appear ill. No distress.   HENT:   Head: Normocephalic.   Ears:   Right Ear: Hearing, tympanic membrane, external ear and ear canal normal.   Left Ear: Hearing, tympanic membrane, external ear and ear canal normal.   Nose: Congestion present. No mucosal edema, rhinorrhea or nasal deformity. No epistaxis. Right sinus exhibits no maxillary sinus tenderness and  no frontal sinus tenderness. Left sinus exhibits no maxillary sinus tenderness and no frontal sinus tenderness.   Mouth/Throat: Uvula is midline, oropharynx is clear and moist and mucous membranes are normal. Mucous membranes are moist. No trismus in the jaw. Normal dentition. No uvula swelling. No oropharyngeal exudate, posterior oropharyngeal edema or posterior oropharyngeal erythema. Oropharynx is clear.   Eyes: Conjunctivae and lids are normal. No scleral icterus.   Neck: Trachea normal and phonation normal. Neck supple. No edema present. No erythema present. No neck rigidity present.   Cardiovascular: Normal rate and regular rhythm.   Pulmonary/Chest: Effort normal and breath sounds normal. No respiratory distress. He has no decreased breath sounds. He has no wheezes. He has no rhonchi.   Abdominal: Normal appearance.   Musculoskeletal: Normal range of motion.         General: No deformity. Normal range of motion.   Neurological: He is alert and oriented to person, place, and time. He exhibits normal muscle tone. Coordination normal.   Skin: Skin is warm, dry, intact, not diaphoretic and not pale.   Psychiatric: His speech is normal and behavior is normal. Judgment and thought content normal.   Nursing note and vitals reviewed.      Assessment:     1. Viral syndrome        Plan:       Viral syndrome  -     SARS Coronavirus 2 Antigen, POCT Manual Read  -     POCT Strep A, Molecular  -     POCT Influenza A/B MOLECULAR      Patient Instructions   Oral fluids  Rest  Steam (hot showers, hot tea)  Blow nose often  Avoid circulating air (such as ceiling fans) dries your airway  Avoid drinking cold drinks (worsens cough)  Avoid strong smells which could worsen cough (perfume, lotions, smoke...)  Therapeutic coughing to expel mucous  Sit in upright position often